# Patient Record
Sex: MALE | Race: WHITE | Employment: FULL TIME | URBAN - METROPOLITAN AREA
[De-identification: names, ages, dates, MRNs, and addresses within clinical notes are randomized per-mention and may not be internally consistent; named-entity substitution may affect disease eponyms.]

---

## 2017-01-04 ENCOUNTER — TRANSFERRED RECORDS (OUTPATIENT)
Dept: HEALTH INFORMATION MANAGEMENT | Facility: CLINIC | Age: 47
End: 2017-01-04

## 2017-02-08 ENCOUNTER — TELEPHONE (OUTPATIENT)
Dept: FAMILY MEDICINE | Facility: OTHER | Age: 47
End: 2017-02-08

## 2017-02-08 NOTE — TELEPHONE ENCOUNTER
Patient calling to see if [facility] offers a provider who prescribes medication for weight loss. Patient is getting  in May and had an injury recently and has gained a little extra ight.     Melodie Gomez  Reception/ Scheduling

## 2017-02-08 NOTE — TELEPHONE ENCOUNTER
Spoke with patient, he states he has been recently diagnosed with diabetes, neuropathy, has had a hip replacement and recently had an epideral injection in his back which has allowed him to start exercising. [facility] States he does cardio 2 x daily every day, cut out sweets and soda and is watching what he eats. States he is down to 210lbs and is having problems losing any more. Pt will stop by clinic today to sign CRISTOPHER to get [facility], scheduled appt. With AE for Monday 2/13/17. Routed to provider as DMITRY Priest CMA

## 2017-02-08 NOTE — PROGRESS NOTES
"  SUBJECTIVE:                                                    Piter Hoyos is a 46 year old male who presents to clinic today for the following health issues:      HPI    Concern - Would like to chat about wt loss medication      Onset: 4-5 months     Description:   Had a hip injury 2 years ago that resulted in surgery. Has gained some weight and is working on getting it off. [facility] doc suggested Phentramine but cannot prescribe it.     Intensity: moderate    Progression of Symptoms:  same    Accompanying Signs & Symptoms:  - weight gain  - self esteem             Therapies Tried and outcome: Getting back to the gym recently       Problem list and histories reviewed & adjusted, as indicated.  Additional history: [facility] with uncontrolled [facility] and felt that if he could get weight loss, then he would be able to get his diabetes under control.  Has been working out 2 times a day at the gym and not seeing any weight loss and did n't understand.  Reviewed the mechanism of diabetes and high blood sugars and the role of hepatogenesis if not eating and exercising.  Needed to work on converting sugar from blood stream to usable energy to help most with weight loss.        Problem list, Medication list, Allergies, and Medical/Social/Surgical histories reviewed in EPIC and updated as appropriate.    ROS:  CONSTITUTIONAL:low energy  E/M: NEGATIVE for ear, mouth and throat problems  R: NEGATIVE for significant cough or SOB  CV: NEGATIVE for chest pain, palpitations or peripheral edema    OBJECTIVE:                                                    /74 (BP Location: Right arm, Patient Position: Chair, Cuff Size: Adult Regular)  Pulse 78  Temp 98.1  F (36.7  C) (Oral)  Resp 18  Ht 5' 7.32\" (1.71 m)  Wt 214 lb (97.1 kg)  BMI 33.2 kg/m2  Body mass index is 33.2 kg/(m^2).   GENERAL: healthy, alert, well nourished, well hydrated, no distress  NECK: no tenderness, no adenopathy, no " asymmetry, no masses, no stiffness; thyroid- normal to palpation  RESP: lungs clear to auscultation - no rales, no rhonchi, no wheezes  CV: regular rates and rhythm, normal S1 S2, no S3 or S4 and no murmur, no click or rub -         ASSESSMENT/PLAN:                                                        ICD-10-CM    1. Need for prophylactic vaccination and inoculation against influenza Z23    2. Need for prophylactic vaccination with tetanus-diphtheria (TD) Z23    3. Tinnitus, unspecified laterality H93.19    4. Skin sensation disturbance R20.9    5. Screening for diabetic peripheral neuropathy Z13.89 FOOT EXAM  NO CHARGE [19452.114]   6. Need for prophylactic vaccination against Streptococcus pneumoniae (pneumococcus) Z23    7. Type 2 diabetes mellitus without complication, without long-term current use of insulin (H) E11.9 Lipid panel reflex to direct LDL     HEMOGLOBIN A1C     Albumin Random Urine Quantitative     TSH WITH FREE T4 REFLEX     Basic metabolic panel  (Ca, Cl, CO2, Creat, Gluc, K, Na, BUN)     Lipid panel reflex to direct LDL     HEMOGLOBIN A1C     Albumin Random Urine Quantitative     TSH WITH FREE T4 REFLEX     Basic metabolic panel  (Ca, Cl, CO2, Creat, Gluc, K, Na, BUN)     exenatide (BYETTA 10 MCG PEN) 10 MCG/0.04ML injection   8. Loss of weight R63.4 phentermine 30 MG capsule     DISCONTINUED: phentermine 30 MG capsule     DISCONTINUED: phentermine 30 MG capsule       Discussed plan as above to work on better diabetes control.  He thinks weight loss is most important, so strongly encouraged [facility] to help.  HE is interested in this and if really would be more successful, even though he is cash pay, would pay the cash for this.  Also [facility] and will apply to get this done there.  [facility] who refuse to use phentermine and have not offered him alternative.  He does ask if he was to take the phentermine as well if these interact.  Although not  technically, the increased cardiovascular risk with diabetes means we need to use with caution and would recommend monitoring closely.  He is cash pay here and plans to f[facility] with [facility].  Asks if he can get prescriptions here and then f/u with [facility] for labs and BP, etc for monitoring.  Agreed this was a reasonable plan.  Will give both as he is planning for his wedding as well.  Given 2 months of prescriptions due to wedding upcoming.  NO refills will be given of the phentermine and would like [facility] records of diabetes labs, etc before more refills with the [facility].    Roro Solano MD, MD  [facility]

## 2017-02-08 NOTE — TELEPHONE ENCOUNTER
Not sure what he intends for weight loss med, but usually most have risks and encouraged upon non-medicine program first.  I think most providers who provide meds do so in conjunction with other efforts.  I do not see any records of other efforts yet, so likely will be encouraged first.  Thanks.  Roro Solano MD

## 2017-02-12 PROBLEM — R42 VERTIGO: Status: ACTIVE | Noted: 2017-02-12

## 2017-02-12 PROBLEM — G89.28 CHRONIC POST-OPERATIVE PAIN: Status: ACTIVE | Noted: 2017-02-12

## 2017-02-12 PROBLEM — I10 BENIGN HYPERTENSION: Status: ACTIVE | Noted: 2017-02-12

## 2017-02-12 PROBLEM — G58.7 MONONEURITIS MULTIPLEX: Status: ACTIVE | Noted: 2017-02-12

## 2017-02-12 PROBLEM — L57.0 ACTINIC KERATOSIS: Status: ACTIVE | Noted: 2017-02-12

## 2017-02-12 PROBLEM — R53.83 MALAISE AND FATIGUE: Status: ACTIVE | Noted: 2017-02-12

## 2017-02-12 PROBLEM — E11.9 TYPE 2 DIABETES MELLITUS WITHOUT COMPLICATION (H): Status: ACTIVE | Noted: 2017-02-12

## 2017-02-12 PROBLEM — M72.2 PLANTAR FASCIITIS: Status: ACTIVE | Noted: 2017-02-12

## 2017-02-12 PROBLEM — F33.1 MODERATE EPISODE OF RECURRENT MAJOR DEPRESSIVE DISORDER (H): Status: ACTIVE | Noted: 2017-02-12

## 2017-02-12 PROBLEM — M16.10 ARTHRITIS OF HIP: Status: ACTIVE | Noted: 2017-02-12

## 2017-02-12 PROBLEM — N52.9 ED (ERECTILE DYSFUNCTION): Status: ACTIVE | Noted: 2017-02-12

## 2017-02-12 PROBLEM — Z80.0 FAMILY HISTORY OF COLON CANCER: Status: ACTIVE | Noted: 2017-02-12

## 2017-02-12 PROBLEM — H93.19 TINNITUS, UNSPECIFIED LATERALITY: Status: ACTIVE | Noted: 2017-02-12

## 2017-02-12 PROBLEM — R53.81 MALAISE AND FATIGUE: Status: ACTIVE | Noted: 2017-02-12

## 2017-02-12 PROBLEM — R20.9 SKIN SENSATION DISTURBANCE: Status: ACTIVE | Noted: 2017-02-12

## 2017-02-12 NOTE — TELEPHONE ENCOUNTER
CRISTOPHER signed and records received.  Patient is poorly controlled diabetic, so will discuss diabetes control as part of weight loss plan.  Will need meds added and a1c to do this.  Labs ordered for prior to visit to assess current control as last labs were from Sept.  Roro Solano MD

## 2017-02-13 ENCOUNTER — OFFICE VISIT (OUTPATIENT)
Dept: FAMILY MEDICINE | Facility: OTHER | Age: 47
End: 2017-02-13

## 2017-02-13 VITALS
BODY MASS INDEX: 33.59 KG/M2 | TEMPERATURE: 98.1 F | RESPIRATION RATE: 18 BRPM | HEIGHT: 67 IN | HEART RATE: 78 BPM | DIASTOLIC BLOOD PRESSURE: 74 MMHG | SYSTOLIC BLOOD PRESSURE: 122 MMHG | WEIGHT: 214 LBS

## 2017-02-13 DIAGNOSIS — R63.4 LOSS OF WEIGHT: ICD-10-CM

## 2017-02-13 DIAGNOSIS — Z23 NEED FOR PROPHYLACTIC VACCINATION AGAINST STREPTOCOCCUS PNEUMONIAE (PNEUMOCOCCUS): ICD-10-CM

## 2017-02-13 DIAGNOSIS — Z13.89 SCREENING FOR DIABETIC PERIPHERAL NEUROPATHY: ICD-10-CM

## 2017-02-13 DIAGNOSIS — E11.9 TYPE 2 DIABETES MELLITUS WITHOUT COMPLICATION, WITHOUT LONG-TERM CURRENT USE OF INSULIN (H): ICD-10-CM

## 2017-02-13 DIAGNOSIS — Z23 NEED FOR PROPHYLACTIC VACCINATION AND INOCULATION AGAINST INFLUENZA: Primary | ICD-10-CM

## 2017-02-13 DIAGNOSIS — Z23 NEED FOR PROPHYLACTIC VACCINATION WITH TETANUS-DIPHTHERIA (TD): ICD-10-CM

## 2017-02-13 DIAGNOSIS — H93.19 TINNITUS, UNSPECIFIED LATERALITY: ICD-10-CM

## 2017-02-13 DIAGNOSIS — R20.9 SKIN SENSATION DISTURBANCE: ICD-10-CM

## 2017-02-13 PROCEDURE — 99207 C FOOT EXAM  NO CHARGE: CPT | Mod: 25 | Performed by: FAMILY MEDICINE

## 2017-02-13 PROCEDURE — 99203 OFFICE O/P NEW LOW 30 MIN: CPT | Performed by: FAMILY MEDICINE

## 2017-02-13 RX ORDER — PHENTERMINE HYDROCHLORIDE 30 MG/1
30 CAPSULE ORAL EVERY MORNING
Qty: 30 CAPSULE | Refills: 0 | Status: SHIPPED | OUTPATIENT
Start: 2017-02-13 | End: 2017-02-13

## 2017-02-13 RX ORDER — PHENTERMINE HYDROCHLORIDE 30 MG/1
30 CAPSULE ORAL EVERY MORNING
Qty: 30 CAPSULE | Refills: 0 | Status: SHIPPED | OUTPATIENT
Start: 2017-04-11

## 2017-02-13 RX ORDER — LIRAGLUTIDE 6 MG/ML
1.2 INJECTION SUBCUTANEOUS DAILY
Qty: 6 ML | Refills: 1 | Status: CANCELLED | OUTPATIENT
Start: 2017-02-13

## 2017-02-13 RX ORDER — PHENTERMINE HYDROCHLORIDE 30 MG/1
30 CAPSULE ORAL EVERY MORNING
Qty: 30 CAPSULE | Refills: 0 | Status: SHIPPED | OUTPATIENT
Start: 2017-03-13 | End: 2017-02-13

## 2017-02-13 RX ORDER — SILDENAFIL 10 MG/ML
20 POWDER, FOR SUSPENSION ORAL
COMMUNITY

## 2017-02-13 ASSESSMENT — PAIN SCALES - GENERAL: PAINLEVEL: MODERATE PAIN (4)

## 2017-02-13 NOTE — MR AVS SNAPSHOT
"              After Visit Summary   2/13/2017    Piter Hoyos    MRN: [id number]           Patient Information     Date Of Birth          1970        Visit Information        Provider Department      2/13/2017 12:30 PM Roro Solano MD [facility]        Today's Diagnoses     Need for prophylactic vaccination and inoculation against influenza    -  1    Need for prophylactic vaccination with tetanus-diphtheria (TD)        Tinnitus, unspecified laterality        Skin sensation disturbance        Screening for diabetic peripheral neuropathy        Need for prophylactic vaccination against Streptococcus pneumoniae (pneumococcus)        Type 2 diabetes mellitus without complication, without long-term current use of insulin (H)        Loss of weight           Follow-ups after your visit        Who to contact     If you have questions or need follow up information about today's clinic visit or your schedule please contact [facility] directly at [phone].  Normal or non-critical lab and imaging results will be communicated to you by [employer], letter or phone within 4 business days after the clinic has received the results. If you do not hear from us within 7 days, please contact the clinic through [employer] or phone. If you have a critical or abnormal lab result, we will notify you by phone as soon as possible.  Submit refill requests through [employer] or call your pharmacy and they will forward the refill request to us. Please allow 3 business days for your refill to be completed.          Additional Information About Your Visit        MyChart Information     [employer] lets you send messages to your doctor, view your test results, renew your prescriptions, schedule appointments and more. To sign up, go to [email or web address] . Click on \"Log in\" on the left side of the screen, which will take you to the Welcome page. Then click on \"Sign up Now\" on the right side of the page. " "    You will be asked to enter the access code listed below, as well as some personal information. Please follow the directions to create your username and password.     Your access code is: [email or web address]  Expires: 2017  2:28 PM     Your access code will  in 90 days. If you need help or a new code, please call your [place] clinic or [phone].        Care EveryWhere ID     This is your Care EveryWhere ID. This could be used by other organizations to access your [place] medical records  [id number]        Your Vitals Were     Pulse Temperature Respirations Height BMI (Body Mass Index)       78 98.1  F (36.7  C) (Oral) 18 5' 7.32\" (1.71 m) 33.2 kg/m2        Blood Pressure from Last 3 Encounters:   17 122/74   06/17/15 127/78    Weight from Last 3 Encounters:   17 214 lb (97.1 kg)              We Performed the Following     Albumin Random Urine Quantitative     Basic metabolic panel  (Ca, Cl, CO2, Creat, Gluc, K, Na, BUN)     FOOT EXAM  NO CHARGE [63558.114]     HEMOGLOBIN A1C     Lipid panel reflex to direct LDL     TSH WITH FREE T4 REFLEX          Today's Medication Changes          These changes are accurate as of: 17  2:28 PM.  If you have any questions, ask your nurse or doctor.               Start taking these medicines.        Dose/Directions    exenatide 10 MCG/0.04ML injection   Commonly known as:  BYETTA 10 MCG PEN   Used for:  Type 2 diabetes mellitus without complication, without long-term current use of insulin (H)   Started by:  Roro Solano MD        Dose:  10 mcg   Inject 10 mcg Subcutaneous 2 times daily   Quantity:  2.4 mL   Refills:  2       phentermine 30 MG capsule   Used for:  Loss of weight   Started by:  Roro Solano MD        Dose:  30 mg   Start taking on:  2017   Take 1 capsule (30 mg) by mouth every morning   Quantity:  30 capsule   Refills:  0            Where to get your medicines      These medications were sent to [id number] - " [place], [place] - [address] [address]  [address] [address] [id number]     Phone:  [phone]     exenatide 10 MCG/0.04ML injection         Some of these will need a paper prescription and others can be bought over the counter.  Ask your nurse if you have questions.     Bring a paper prescription for each of these medications     phentermine 30 MG capsule                Primary Care Provider Office Phone # Fax #    Jovan JACK Jorge A [phone]       [facility]     [facility] [address]        Thank you!     Thank you for choosing [facility]  for your care. Our goal is always to provide you with excellent care. Hearing back from our patients is one way we can continue to improve our services. Please take a few minutes to complete the written survey that you may receive in the mail after your visit with us. Thank you!             Your Updated Medication List - Protect others around you: Learn how to safely use, store and throw away your medicines at www.disposemymeds.org.          This list is accurate as of: 2/13/17  2:28 PM.  Always use your most recent med list.                   Brand Name Dispense Instructions for use    cholecalciferol 1000 UNIT tablet    vitamin D     Take 1,000 Units by mouth daily Reported on 2/13/2017       exenatide 10 MCG/0.04ML injection    BYETTA 10 MCG PEN    2.4 mL    Inject 10 mcg Subcutaneous 2 times daily       metFORMIN 1000 MG tablet    GLUCOPHAGE     Take 500 mg by mouth 2 times daily (with meals) Reported on 2/13/2017       Multi-vitamin Tabs tablet      Take 1 tablet by mouth daily Reported on 2/13/2017       phentermine 30 MG capsule   Start taking on:  4/11/2017     30 capsule    Take 1 capsule (30 mg) by mouth every morning       sildenafil 10 MG/ML Susr    REVATIO     Take 20 mg by mouth Reported on 2/13/2017

## 2017-02-13 NOTE — NURSING NOTE
"Chief Complaint   Patient presents with     Weight Problem     discuss weight loss med     Panel Management     tdap, flu, lipids, mattie, mychart     Diabetes       Initial /74 (BP Location: Right arm, Patient Position: Chair, Cuff Size: Adult Regular)  Pulse 78  Temp 98.1  F (36.7  C) (Oral)  Resp 18  Ht 5' 7.32\" (1.71 m)  Wt 214 lb (97.1 kg)  BMI 33.2 kg/m2 Estimated body mass index is 33.2 kg/(m^2) as calculated from the following:    Height as of this encounter: 5' 7.32\" (1.71 m).    Weight as of this encounter: 214 lb (97.1 kg).  Medication Reconciliation: complete    "

## 2017-02-13 NOTE — TELEPHONE ENCOUNTER
Reason for call:  Medication  Reason for Call:  Medication or medication refill:    Do you use a [facility]?  Name of the pharmacy and phone number for the current request:  [facility] - [phone]    Name of the medication requested: beuetta    Other request: when he got to the pharmacy they said it would cost him over 800 per month for this. Call to discuss alternative    Can we leave a detailed message on this number? YES    Phone number patient can be reached at: Home number on file [phone] (home)    Best Time: any    Call taken on 2/13/2017 at 5:03 PM by Skylar Mcdowell

## 2017-02-14 ASSESSMENT — PATIENT HEALTH QUESTIONNAIRE - PHQ9: SUM OF ALL RESPONSES TO PHQ QUESTIONS 1-9: 0

## 2017-02-14 NOTE — TELEPHONE ENCOUNTER
Called patient and gave him Dr. Solano's message below. Gave him the pharmacy assistance number [phone] to help him. No further questions. Closing encounter.  Litzy Sprague MA

## 2017-02-14 NOTE — TELEPHONE ENCOUNTER
Talked to pharm as they have coupons, etc, but needs an insurance card to get the coupon.  Would do best to call prescription assistance for help applying to the drug company for an exemption due to his [facility] coverage is like a prescription plan for coupon purposes.    Please give him the number to call and see what they can do and what he can switch to.  Roro Solano MD

## 2017-03-20 ENCOUNTER — TELEPHONE (OUTPATIENT)
Dept: FAMILY MEDICINE | Facility: OTHER | Age: 47
End: 2017-03-20

## 2017-03-20 NOTE — TELEPHONE ENCOUNTER
Panel Management Review      Patient has the following on his problem list:     Depression / Dysthymia review  PHQ-9 SCORE 2/13/2017   Total Score 0      Patient is due for:  None    Diabetes        Last A1C  No results found for: A1C  A1C tested: not tested    Last LDL:    Lab Results   Component Value Date    CHOL 174 06/14/2016     Lab Results   Component Value Date    HDL 27 06/14/2016     No results found for: LDL  Lab Results   Component Value Date    TRIG 194 06/14/2016     No results found for: CHOLHDLRATIO  No results found for: NHDL    Is the patient on a Statin? NO             Is the patient on Aspirin? NO        Last three blood pressure readings:  BP Readings from Last 3 Encounters:   02/13/17 122/74   06/17/15 127/78       Date of last diabetes office visit: never with [facility]     Tobacco History:     History   Smoking Status     Former Smoker     Quit date: 1/7/2007   Smokeless Tobacco     Not on file           Composite cancer screening  Chart review shows that this patient is due/due soon for the following None  Summary:    Patient is due/failing the following:   Tsh, bmp, lipids, a1c, micro albumin    Action needed:   Patient needs office visit for diabetic f/u and labs .    Type of outreach:    Phone, left message for patient to call back.      FYI-Pt will likely not follow up with us, [facility] and his last visit with us he had to pay cash.     Questions for provider review:    None                                                                                                                                    Tawny Aguilera MA     Chart routed to Care Team .

## 2017-03-28 NOTE — TELEPHONE ENCOUNTER
Spoke with patient he [facility]. Dominique Travis CMA ([facility])

## 2020-03-11 ENCOUNTER — OFFICE VISIT (OUTPATIENT)
Dept: URGENT CARE | Facility: RETAIL CLINIC | Age: 50
End: 2020-03-11

## 2020-03-11 VITALS
HEART RATE: 89 BPM | DIASTOLIC BLOOD PRESSURE: 82 MMHG | SYSTOLIC BLOOD PRESSURE: 127 MMHG | TEMPERATURE: 97.7 F | OXYGEN SATURATION: 99 %

## 2020-03-11 DIAGNOSIS — J20.9 ACUTE BRONCHITIS WITH SYMPTOMS > 10 DAYS: Primary | ICD-10-CM

## 2020-03-11 PROCEDURE — 99203 OFFICE O/P NEW LOW 30 MIN: CPT | Performed by: INTERNAL MEDICINE

## 2020-03-11 RX ORDER — AZITHROMYCIN 250 MG/1
TABLET, FILM COATED ORAL
Qty: 6 TABLET | Refills: 0 | Status: SHIPPED | OUTPATIENT
Start: 2020-03-11 | End: 2020-03-16

## 2020-03-11 NOTE — PROGRESS NOTES
[facility] Care Progress Note        Batool Smith MD, MPH  03/11/2020        History:      Piter Hoyos is a pleasant 49 year old year old male with a chief complaint of nasal congestion and cough w some whitish sputum since 4 weeks ago.   No fever or chills.   No dyspnea or wheezing. No chest pain.   Smokes 3/4 of pack of cigs daily.  No headache or neck pain.  No GI or  symptoms.   No MSK symptoms.         Assessment and Plan:          Acute bronchitis with symptoms > 10 days    - azithromycin (ZITHROMAX) 250 MG tablet; Take 2 tablets (500 mg) by mouth daily for 1 day, THEN 1 tablet (250 mg) daily for 4 days.  Dispense: 6 tablet; Refill: 0  Advised to:   stop smoking.  Wash hands w soap and water frequently.  Discussed supportive care with the patient  Advised to increase fluid intake and rest.  F/u w PCP in 4-5 days, earlier if symptoms worsen.                   Physical Exam:      /82   Pulse 89   Temp 97.7  F (36.5  C) (Temporal)   SpO2 99%      Constitutional: Patient is in no distress The patient is pleasant and cooperative.   HEENT: Head:  Head is atraumatic, normocephalic.    Eyes: Pupils are equal, round and reactive to light and accomodation.  Sclera is non-icteric. No conjunctival injection, or exudate noted. Extraocular motion is intact. Visual acuity is intact bilaterally.  Ears:  External acoustic canals are patent and clear.  There is no erythema or bulging of the tympanic membranes.  No swelling, erythema or tenderness upon palpation of the mastoid processes are noted.  Nose:  Nasal congestion w/o drainage or mucosal ulceration is noted.  Throat:  Oral mucosa is moist.  No oral lesions are noted. Posterior pharynx is clear.     Neck Supple.  There is no cervical / Postauricular lymphadenopathy.  No nuchal rigidity noted.  There is no meningismus.     Cardiovascular: Heart is regular to rate and rhythm.  No murmur is noted.     Lungs: Clear in the anterior and  posterior pulmonary fields.   Abdomen: Soft and non-tender.                                   Data:      All new lab and imaging data was reviewed.   No results found for any visits on 03/11/20.

## 2025-05-01 ENCOUNTER — LAB (OUTPATIENT)
Dept: LAB | Facility: HOSPITAL | Age: 55
End: 2025-05-01

## 2025-05-01 DIAGNOSIS — C25.9 MALIGNANT NEOPLASM OF PANCREAS (H): Primary | ICD-10-CM

## 2025-05-01 LAB
AMYLASE SERPL-CCNC: 84 U/L (ref 28–100)
LIPASE SERPL-CCNC: 26 U/L (ref 13–60)

## 2025-05-01 PROCEDURE — 83690 ASSAY OF LIPASE: CPT

## 2025-05-01 PROCEDURE — 82150 ASSAY OF AMYLASE: CPT

## 2025-05-01 PROCEDURE — 36415 COLL VENOUS BLD VENIPUNCTURE: CPT
